# Patient Record
Sex: MALE | Race: OTHER | HISPANIC OR LATINO | URBAN - METROPOLITAN AREA
[De-identification: names, ages, dates, MRNs, and addresses within clinical notes are randomized per-mention and may not be internally consistent; named-entity substitution may affect disease eponyms.]

---

## 2024-03-12 ENCOUNTER — EMERGENCY (EMERGENCY)
Facility: HOSPITAL | Age: 24
LOS: 1 days | Discharge: HOME | End: 2024-03-12
Attending: EMERGENCY MEDICINE | Admitting: EMERGENCY MEDICINE
Payer: COMMERCIAL

## 2024-03-12 VITALS
OXYGEN SATURATION: 99 % | RESPIRATION RATE: 20 BRPM | DIASTOLIC BLOOD PRESSURE: 77 MMHG | SYSTOLIC BLOOD PRESSURE: 118 MMHG | TEMPERATURE: 99 F | HEART RATE: 70 BPM

## 2024-03-12 PROCEDURE — 71045 X-RAY EXAM CHEST 1 VIEW: CPT | Mod: 26

## 2024-03-12 PROCEDURE — 72192 CT PELVIS W/O DYE: CPT | Mod: 26,MC

## 2024-03-12 PROCEDURE — 70450 CT HEAD/BRAIN W/O DYE: CPT | Mod: 26,MC

## 2024-03-12 PROCEDURE — 99285 EMERGENCY DEPT VISIT HI MDM: CPT

## 2024-03-12 PROCEDURE — 72128 CT CHEST SPINE W/O DYE: CPT | Mod: 26,MC

## 2024-03-12 PROCEDURE — 72125 CT NECK SPINE W/O DYE: CPT | Mod: 26,MC

## 2024-03-12 PROCEDURE — 99053 MED SERV 10PM-8AM 24 HR FAC: CPT

## 2024-03-12 PROCEDURE — 72131 CT LUMBAR SPINE W/O DYE: CPT | Mod: 26,MC

## 2024-03-12 RX ORDER — SODIUM CHLORIDE 9 MG/ML
250 INJECTION INTRAMUSCULAR; INTRAVENOUS; SUBCUTANEOUS ONCE
Refills: 0 | Status: COMPLETED | OUTPATIENT
Start: 2024-03-12 | End: 2024-03-12

## 2024-03-12 RX ORDER — MORPHINE SULFATE 50 MG/1
4 CAPSULE, EXTENDED RELEASE ORAL ONCE
Refills: 0 | Status: DISCONTINUED | OUTPATIENT
Start: 2024-03-12 | End: 2024-03-12

## 2024-03-12 RX ORDER — ONDANSETRON 8 MG/1
4 TABLET, FILM COATED ORAL ONCE
Refills: 0 | Status: COMPLETED | OUTPATIENT
Start: 2024-03-12 | End: 2024-03-12

## 2024-03-12 RX ORDER — TETANUS TOXOID, REDUCED DIPHTHERIA TOXOID AND ACELLULAR PERTUSSIS VACCINE, ADSORBED 5; 2.5; 8; 8; 2.5 [IU]/.5ML; [IU]/.5ML; UG/.5ML; UG/.5ML; UG/.5ML
0.5 SUSPENSION INTRAMUSCULAR ONCE
Refills: 0 | Status: COMPLETED | OUTPATIENT
Start: 2024-03-12 | End: 2024-03-12

## 2024-03-12 RX ADMIN — MORPHINE SULFATE 4 MILLIGRAM(S): 50 CAPSULE, EXTENDED RELEASE ORAL at 23:23

## 2024-03-12 RX ADMIN — SODIUM CHLORIDE 250 MILLILITER(S): 9 INJECTION INTRAMUSCULAR; INTRAVENOUS; SUBCUTANEOUS at 23:52

## 2024-03-12 RX ADMIN — TETANUS TOXOID, REDUCED DIPHTHERIA TOXOID AND ACELLULAR PERTUSSIS VACCINE, ADSORBED 0.5 MILLILITER(S): 5; 2.5; 8; 8; 2.5 SUSPENSION INTRAMUSCULAR at 23:52

## 2024-03-12 RX ADMIN — ONDANSETRON 4 MILLIGRAM(S): 8 TABLET, FILM COATED ORAL at 23:23
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
## 2024-03-13 VITALS
OXYGEN SATURATION: 100 % | SYSTOLIC BLOOD PRESSURE: 113 MMHG | DIASTOLIC BLOOD PRESSURE: 64 MMHG | RESPIRATION RATE: 17 BRPM | HEART RATE: 69 BPM | TEMPERATURE: 98 F

## 2024-03-13 LAB
ALBUMIN SERPL ELPH-MCNC: 3.7 G/DL — SIGNIFICANT CHANGE UP (ref 3.4–5)
ALP SERPL-CCNC: 64 U/L — SIGNIFICANT CHANGE UP (ref 40–120)
ALT FLD-CCNC: 33 U/L — SIGNIFICANT CHANGE UP (ref 12–42)
ANION GAP SERPL CALC-SCNC: 11 MMOL/L — SIGNIFICANT CHANGE UP (ref 9–16)
APTT BLD: 28.5 SEC — SIGNIFICANT CHANGE UP (ref 24.5–35.6)
AST SERPL-CCNC: 27 U/L — SIGNIFICANT CHANGE UP (ref 15–37)
BASOPHILS # BLD AUTO: 0.04 K/UL — SIGNIFICANT CHANGE UP (ref 0–0.2)
BASOPHILS # BLD AUTO: 0.05 K/UL — SIGNIFICANT CHANGE UP (ref 0–0.2)
BASOPHILS NFR BLD AUTO: 0.3 % — SIGNIFICANT CHANGE UP (ref 0–2)
BASOPHILS NFR BLD AUTO: 0.3 % — SIGNIFICANT CHANGE UP (ref 0–2)
BILIRUB SERPL-MCNC: 0.7 MG/DL — SIGNIFICANT CHANGE UP (ref 0.2–1.2)
BUN SERPL-MCNC: 7 MG/DL — SIGNIFICANT CHANGE UP (ref 7–23)
CALCIUM SERPL-MCNC: 8.8 MG/DL — SIGNIFICANT CHANGE UP (ref 8.5–10.5)
CHLORIDE SERPL-SCNC: 106 MMOL/L — SIGNIFICANT CHANGE UP (ref 96–108)
CO2 SERPL-SCNC: 24 MMOL/L — SIGNIFICANT CHANGE UP (ref 22–31)
CREAT SERPL-MCNC: 0.79 MG/DL — SIGNIFICANT CHANGE UP (ref 0.5–1.3)
EGFR: 126 ML/MIN/1.73M2 — SIGNIFICANT CHANGE UP
EOSINOPHIL # BLD AUTO: 0.01 K/UL — SIGNIFICANT CHANGE UP (ref 0–0.5)
EOSINOPHIL # BLD AUTO: 0.04 K/UL — SIGNIFICANT CHANGE UP (ref 0–0.5)
EOSINOPHIL NFR BLD AUTO: 0.1 % — SIGNIFICANT CHANGE UP (ref 0–6)
EOSINOPHIL NFR BLD AUTO: 0.3 % — SIGNIFICANT CHANGE UP (ref 0–6)
ETHANOL SERPL-MCNC: <3 MG/DL — SIGNIFICANT CHANGE UP
GLUCOSE SERPL-MCNC: 117 MG/DL — HIGH (ref 70–99)
HCT VFR BLD CALC: 40.5 % — SIGNIFICANT CHANGE UP (ref 39–50)
HCT VFR BLD CALC: 41.8 % — SIGNIFICANT CHANGE UP (ref 39–50)
HGB BLD-MCNC: 13.8 G/DL — SIGNIFICANT CHANGE UP (ref 13–17)
HGB BLD-MCNC: 14.6 G/DL — SIGNIFICANT CHANGE UP (ref 13–17)
HIV 1 & 2 AB SERPL IA.RAPID: SIGNIFICANT CHANGE UP
IMM GRANULOCYTES NFR BLD AUTO: 0.5 % — SIGNIFICANT CHANGE UP (ref 0–0.9)
IMM GRANULOCYTES NFR BLD AUTO: 1 % — HIGH (ref 0–0.9)
INR BLD: 1.1 — SIGNIFICANT CHANGE UP (ref 0.85–1.18)
LACTATE BLDV-MCNC: 1.8 MMOL/L — SIGNIFICANT CHANGE UP (ref 0.5–2)
LIDOCAIN IGE QN: 27 U/L — SIGNIFICANT CHANGE UP (ref 16–77)
LYMPHOCYTES # BLD AUTO: 1.56 K/UL — SIGNIFICANT CHANGE UP (ref 1–3.3)
LYMPHOCYTES # BLD AUTO: 10.2 % — LOW (ref 13–44)
LYMPHOCYTES # BLD AUTO: 18.9 % — SIGNIFICANT CHANGE UP (ref 13–44)
LYMPHOCYTES # BLD AUTO: 2.87 K/UL — SIGNIFICANT CHANGE UP (ref 1–3.3)
MCHC RBC-ENTMCNC: 31.5 PG — SIGNIFICANT CHANGE UP (ref 27–34)
MCHC RBC-ENTMCNC: 32.2 PG — SIGNIFICANT CHANGE UP (ref 27–34)
MCHC RBC-ENTMCNC: 34.1 GM/DL — SIGNIFICANT CHANGE UP (ref 32–36)
MCHC RBC-ENTMCNC: 34.9 GM/DL — SIGNIFICANT CHANGE UP (ref 32–36)
MCV RBC AUTO: 92.3 FL — SIGNIFICANT CHANGE UP (ref 80–100)
MCV RBC AUTO: 92.5 FL — SIGNIFICANT CHANGE UP (ref 80–100)
MONOCYTES # BLD AUTO: 0.97 K/UL — HIGH (ref 0–0.9)
MONOCYTES # BLD AUTO: 1.23 K/UL — HIGH (ref 0–0.9)
MONOCYTES NFR BLD AUTO: 6.4 % — SIGNIFICANT CHANGE UP (ref 2–14)
MONOCYTES NFR BLD AUTO: 8.1 % — SIGNIFICANT CHANGE UP (ref 2–14)
NEUTROPHILS # BLD AUTO: 10.97 K/UL — HIGH (ref 1.8–7.4)
NEUTROPHILS # BLD AUTO: 12.49 K/UL — HIGH (ref 1.8–7.4)
NEUTROPHILS NFR BLD AUTO: 72.1 % — SIGNIFICANT CHANGE UP (ref 43–77)
NEUTROPHILS NFR BLD AUTO: 81.8 % — HIGH (ref 43–77)
NRBC # BLD: 0 /100 WBCS — SIGNIFICANT CHANGE UP (ref 0–0)
NRBC # BLD: 0 /100 WBCS — SIGNIFICANT CHANGE UP (ref 0–0)
PLATELET # BLD AUTO: 178 K/UL — SIGNIFICANT CHANGE UP (ref 150–400)
PLATELET # BLD AUTO: 179 K/UL — SIGNIFICANT CHANGE UP (ref 150–400)
POTASSIUM SERPL-MCNC: 3.1 MMOL/L — LOW (ref 3.5–5.3)
POTASSIUM SERPL-SCNC: 3.1 MMOL/L — LOW (ref 3.5–5.3)
PROT SERPL-MCNC: 6.6 G/DL — SIGNIFICANT CHANGE UP (ref 6.4–8.2)
PROTHROM AB SERPL-ACNC: 12.4 SEC — SIGNIFICANT CHANGE UP (ref 9.5–13)
RBC # BLD: 4.38 M/UL — SIGNIFICANT CHANGE UP (ref 4.2–5.8)
RBC # BLD: 4.53 M/UL — SIGNIFICANT CHANGE UP (ref 4.2–5.8)
RBC # FLD: 12.8 % — SIGNIFICANT CHANGE UP (ref 10.3–14.5)
RBC # FLD: 12.8 % — SIGNIFICANT CHANGE UP (ref 10.3–14.5)
SODIUM SERPL-SCNC: 141 MMOL/L — SIGNIFICANT CHANGE UP (ref 132–145)
WBC # BLD: 15.21 K/UL — HIGH (ref 3.8–10.5)
WBC # BLD: 15.26 K/UL — HIGH (ref 3.8–10.5)
WBC # FLD AUTO: 15.21 K/UL — HIGH (ref 3.8–10.5)
WBC # FLD AUTO: 15.26 K/UL — HIGH (ref 3.8–10.5)

## 2024-03-13 PROCEDURE — 74177 CT ABD & PELVIS W/CONTRAST: CPT | Mod: 26,MC

## 2024-03-13 PROCEDURE — 71260 CT THORAX DX C+: CPT | Mod: 26,MC

## 2024-03-13 RX ORDER — ACETAMINOPHEN 500 MG
2 TABLET ORAL
Qty: 360 | Refills: 0
Start: 2024-03-13 | End: 2024-04-11

## 2024-03-13 RX ORDER — IBUPROFEN 200 MG
1 TABLET ORAL
Qty: 30 | Refills: 0
Start: 2024-03-13

## 2024-03-13 RX ORDER — HYDROMORPHONE HYDROCHLORIDE 2 MG/ML
1 INJECTION INTRAMUSCULAR; INTRAVENOUS; SUBCUTANEOUS ONCE
Refills: 0 | Status: DISCONTINUED | OUTPATIENT
Start: 2024-03-13 | End: 2024-03-13

## 2024-03-13 RX ORDER — ACETAMINOPHEN 500 MG
1000 TABLET ORAL ONCE
Refills: 0 | Status: COMPLETED | OUTPATIENT
Start: 2024-03-13 | End: 2024-03-13

## 2024-03-13 RX ORDER — OXYCODONE HYDROCHLORIDE 5 MG/1
1 TABLET ORAL
Qty: 12 | Refills: 0
Start: 2024-03-13 | End: 2024-03-15

## 2024-03-13 RX ADMIN — SODIUM CHLORIDE 250 MILLILITER(S): 9 INJECTION INTRAMUSCULAR; INTRAVENOUS; SUBCUTANEOUS at 01:11

## 2024-03-13 RX ADMIN — Medication 400 MILLIGRAM(S): at 03:09

## 2024-03-13 RX ADMIN — HYDROMORPHONE HYDROCHLORIDE 1 MILLIGRAM(S): 2 INJECTION INTRAMUSCULAR; INTRAVENOUS; SUBCUTANEOUS at 00:12

## 2024-03-13 RX ADMIN — MORPHINE SULFATE 4 MILLIGRAM(S): 50 CAPSULE, EXTENDED RELEASE ORAL at 00:10

## 2024-03-13 RX ADMIN — HYDROMORPHONE HYDROCHLORIDE 1 MILLIGRAM(S): 2 INJECTION INTRAMUSCULAR; INTRAVENOUS; SUBCUTANEOUS at 01:11

## 2024-03-13 NOTE — ED PROVIDER NOTE - NSFOLLOWUPINSTRUCTIONS_ED_ALL_ED_FT
Hematoma  Hematoma  Un hematoma es vinita acumulación de wu debajo de la piel, en un órgano, en un sitio del cuerpo, en un espacio articular o en otro tejido. La wu puede espesarse (coagularse) para formar un bulto que se puede lj y sentir. El bulto suele ser firme y puede ser doloroso y sensible. La mayoría de los hematomas mejoran en unos pocos días o semanas. Sin embargo, algunos hematomas pueden ser graves y requieren atención médica. Los hematomas pueden variar desde muy pequeños hasta muy grandes.    ¿Cuáles son las causas?  Las causas de esta afección son:  Vinita lesión cerrada o penetrante.  Pérdida de wu de un vaso sanguíneo bajo la piel.  Algunos procedimientos médicos, incluidas las cirugías, daniel las cirugías bucales, las de eliminación de arrugas y las de articulaciones.  Algunas afecciones médicas que causan sangrado o moretones. Pueden aparecer varios hematomas en diferentes partes del cuerpo.  ¿Qué incrementa el riesgo?  Es más probable que contraiga esta afección si:  Es un adulto mayor.  Usa anticoagulantes.  Con regularidad, usa antiinflamatorios no esteroideos (NELI), daniel ibuprofeno, para aliviar el dolor.  Practica deportes de contacto.  ¿Cuáles son los signos o síntomas?  Comparison of a normal ankle and an ankle that is swollen and bruised.  Los síntomas de esta afección dependen del lugar donde se encuentre el hematoma.    Los síntomas comunes de un hematoma que está debajo de la piel incluyen los siguientes:  Un bulto firme en el cuerpo.  Dolor y sensibilidad en la jt.  Moretones. La piel puede tornarse de color asha, abraham púrpura o amarillo (coloración) en el lugar del hematoma si zen está cerca de la superficie de la piel.  Los síntomas comunes de un hematoma que está en un lugar profundo de los tejidos o espacios corporales pueden ser menos evidentes. Estos incluyen los siguientes:  Vinita acumulación de wu en el estómago (hematoma intraabdominal). Berkley puede causar dolor en el abdomen, debilidad, desmayos y falta de aire.  Vinita acumulación de wu dentro de la jose j (hematoma intracraneal). Esta puede causar dolor de jose j o síntomas daniel debilidad, dificultad para hablar o para entender, o un cambio en el estado de conciencia.  ¿Cómo se diagnostica?  Esta afección se diagnostica en función de lo siguiente:  Radha antecedentes médicos.  Un examen físico.  Estudios de diagnóstico por imágenes, daniel vinita ecografía o vinita exploración por tomografía computarizada (TC). Berkley será necesario si el médico sospecha que hay un hematoma en tejidos o espacios corporales más profundos.  Análisis de wu. Puede que deban realizarle estos análisis si rodriguez médico mell que el hematoma es la consecuencia de vinita afección.  ¿Cómo se trata?  El tratamiento de esta afección depende de la causa, del tamaño y de la ubicación del hematoma. El tratamiento puede incluir:  No hacer nada. La mayoría de los hematomas no necesitan tratamiento porque muchos de ellos desaparecen solos.  Cirugía o control minucioso. Berkley puede ser necesario para los hematomas grandes o los hematomas que afectan a los órganos vitales.  Medicamentos. Es posible que le den medicamentos si el hematoma tiene vinita causa médica subyacente.  Siga estas indicaciones en rodriguez casa:  Control del dolor, la rigidez y la hinchazón    Bag of ice on a towel on the skin.  Si se lo indican, aplique hielo sobre la jt de la lesión. Para hacer esto:  Ponga el hielo en vinita bolsa plástica.  Coloque vinita toalla entre la piel y la bolsa.  Deje el hielo kamron 20 minutos, de 2 a 3 veces por día, kamron los primeros días.  Si la piel se le pone de color abraham brillante, retire el hielo de inmediato para evitar daños en la piel. El riesgo de daño en la piel es mayor si no puede sentir dolor, calor o frío.  Si se lo indican, aplique calor en la jt afectada con la frecuencia que le haya indicado el médico. Use la venus de calor que el médico le recomiende, daniel vinita compresa de calor húmedo o vinita almohadilla térmica.  Coloque vinita toalla entre la piel y la veuns de calor.  Aplique calor kamron 20 a 30 minutos.  Si la piel se le pone de color abraham brillante, retire el calor de inmediato para evitar quemaduras. El riesgo de quemaduras es mayor si no puede sentir el dolor, el calor o el frío.  Cuando esté sentado o acostado, levante (eleve) la jt lesionada por encima del nivel del corazón.  Si se lo indican, envuelva la jt afectada con vinita venda elástica. La venda aplica presión (compresión) en la jt, lo que puede ayudar a reducir la hinchazón y a promover la curación. No la ajuste demasiado alrededor de la jt afectada.  Si el hematoma está en vinita pierna o un pie (extremidad inferior) y duele, puede que rodriguez médico le recomiende el uso de muletas. Úselas daniel se lo haya indicado el médico.  Indicaciones generales    Use los medicamentos de venta sara y los recetados solamente daniel se lo haya indicado el médico.  Chinyere reposo para que descanse la jt lesionada daniel se lo haya indicado el médico.  Concurra a todas las visitas de seguimiento. Es posible que el médico desee lj cómo evoluciona el hematoma con el tratamiento.  Comuníquese con un médico si:  Tiene fiebre.  La hinchazón y la coloración empeoran.  Aparecen nuevos hematomas.  El dolor empeora o no se controla con los medicamentos.  La piel sobre el hematoma se agrieta o comienza a sangrar.  Solicite ayuda de inmediato si:  El hematoma se encuentra en el tórax o el abdomen y siente debilidad, le falta el aire o se altera rodriguez conocimiento.  Tiene un hematoma en el cuero cabelludo causado por vinita caída o vinita lesión y también tiene:  Un dolor de jose j que empeora.  Dificultad para hablar o comprender el lenguaje.  Debilidad.  Un cambio en el estado de alerta o en la conciencia.  Estos síntomas pueden indicar vinita emergencia. Solicite ayuda de inmediato. Llame al 911.  No espere a lj si los síntomas desaparecen.  No conduzca por radha propios medios hasta el hospital.    Esta información no tiene daniel fin reemplazar el consejo del médico. Asegúrese de hacerle al médico cualquier pregunta que tenga.    POR FAVOR CHINYERE UN SEGUIMIENTO CON RODRIGUEZ MÉDICO DE ATENCIÓN PRIMARIA EN 1-2 DÍAS PARA VINITA EVALUACIÓN ADICIONAL.    POR FAVOR LLEVE TODOS LOS DOCUMENTOS DE LA VISITA DE HOY A RODRIGUEZ MÉDICO PRINCIPAL.    SI NO TIENE UN MÉDICO DE ATENCIÓN PRIMARIA, CONSULTE LA INFORMACIÓN DEL CONSULTORIO/CLÍNICA QUE SE INDICA A CONTINUACIÓN.    Si no tiene un médico, puede llamar a nuestra línea de referencia para encontrar un médico que coincida con rodriguez seguro.    También puede hacer un seguimiento con las clínicas que se enumeran a continuación si no tiene un médico:  [facility]  [address] [place] [address]  Para hacer vinita livan, sandra al [phone]    [facility], [place]  Dirección: [facility] [address] [place] [address]  Centro de citas: [phone] ([phone])    VUELVA A LA EMERGENCIA INMEDIATAMENTE O LLAME  CUALQUIER FIEBRE ISMAEL, DOLOR DE PECHO, DIFICULTAD PARA RESPIRAR, VÓMITOS, DOLOR INTENSIVO O CUALQUIER OTRA PREOCUPACIÓN.
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
## 2024-03-13 NOTE — ED PROVIDER NOTE - PATIENT PORTAL LINK FT
You can access the FollowMyHealth Patient Portal offered by [facility] by registering at the following website: [email or web address]. By joining [facility]’s FollowMyHealth portal, you will also be able to view your health information using other applications (apps) compatible with our system.
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
## 2024-03-13 NOTE — ED PROVIDER NOTE - PHYSICAL EXAMINATION
PE: Patient A&Ox3, well-appearing, and in moderate distress hyperventilating. Head NC; tenderness to palpation over the right parietotemporal area. EOM intact and PERRL. Oropharynx with MMM. Heart rate regular, with no murmurs/gallops/clicks/rubs. Breath sounds clear to auscultation bilaterally. Abdomen NTND, soft, with normal bowel sounds. Skin warm and dry. 5/5 strength in upper extremities and 3/5 strength in BLE secondary to pain with sensation intact to light touch. CN II-XII grossly intact.
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
## 2024-03-14 DIAGNOSIS — Z23 ENCOUNTER FOR IMMUNIZATION: ICD-10-CM

## 2024-03-14 DIAGNOSIS — M25.551 PAIN IN RIGHT HIP: ICD-10-CM

## 2024-03-14 DIAGNOSIS — R51.9 HEADACHE, UNSPECIFIED: ICD-10-CM
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
## 2024-03-14 DIAGNOSIS — S30.1XXA CONTUSION OF ABDOMINAL WALL, INITIAL ENCOUNTER: ICD-10-CM

## 2024-03-14 DIAGNOSIS — M25.552 PAIN IN LEFT HIP: ICD-10-CM
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
## 2024-03-14 DIAGNOSIS — M54.50 LOW BACK PAIN, UNSPECIFIED: ICD-10-CM

## 2024-03-14 DIAGNOSIS — S09.90XA UNSPECIFIED INJURY OF HEAD, INITIAL ENCOUNTER: ICD-10-CM

## 2024-09-18 NOTE — ED ADULT NURSE NOTE - CADM POA URETHRAL CATHETER
Show Applicator Variable?: Yes Include Z78.9 (Other Specified Conditions Influencing Health Status) As An Associated Diagnosis?: No Number Of Freeze-Thaw Cycles: 3 freeze-thaw cycles Post-Care Instructions: I reviewed with the patient in detail post-care instructions. Patient is to wear sunprotection, and avoid picking at any of the treated lesions. Pt may apply Vaseline to crusted or scabbing areas. Spray Paint Text: The liquid nitrogen was applied to the skin utilizing a spray paint frosting technique. Medical Necessity Clause: This procedure was medically necessary because the lesions that were treated were: Detail Level: Simple Consent: The patient's consent was obtained including but not limited to risks of crusting, scabbing, blistering, scarring, darker or lighter pigmentary change, recurrence, incomplete removal and infection. Medical Necessity Information: It is in your best interest to select a reason for this procedure from the list below. All of these items fulfill various CMS LCD requirements except the new and changing color options. No